# Patient Record
Sex: MALE | Race: WHITE
[De-identification: names, ages, dates, MRNs, and addresses within clinical notes are randomized per-mention and may not be internally consistent; named-entity substitution may affect disease eponyms.]

---

## 2019-03-05 ENCOUNTER — HOSPITAL ENCOUNTER (EMERGENCY)
Dept: HOSPITAL 72 - EMR | Age: 29
Discharge: HOME | End: 2019-03-05
Payer: COMMERCIAL

## 2019-03-05 VITALS — DIASTOLIC BLOOD PRESSURE: 78 MMHG | SYSTOLIC BLOOD PRESSURE: 128 MMHG

## 2019-03-05 VITALS — BODY MASS INDEX: 24.34 KG/M2 | WEIGHT: 170 LBS | HEIGHT: 70 IN

## 2019-03-05 VITALS — SYSTOLIC BLOOD PRESSURE: 125 MMHG | DIASTOLIC BLOOD PRESSURE: 70 MMHG

## 2019-03-05 DIAGNOSIS — T78.40XA: Primary | ICD-10-CM
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
## 2019-03-05 DIAGNOSIS — J45.909: ICD-10-CM

## 2019-03-05 DIAGNOSIS — Z91.018: ICD-10-CM

## 2019-03-05 PROCEDURE — 99282 EMERGENCY DEPT VISIT SF MDM: CPT

## 2019-03-05 NOTE — EMERGENCY ROOM REPORT
History of Present Illness


General


Chief Complaint:  Allergic Reaction


Source:  Patient





Present Illness


HPI


Patient is a 29-year-old male who presented after increased tongue and facial 

swelling.  Patient had prior history of multiple food allergies which include 

nuts and legumes.  Patient reportedly had been eating at a [place] restaurant 

and began having increased tongue swelling as well as lip burning.  He stated 

he took some Benadryl at location.  He subsequently used Epinephrine 

autoinjector.


Allergies:  


Uncoded Allergies:  


     NUTS (Allergy, Unknown, 3/5/19)





Patient History


Reviewed Nursing Documentation:  PMH: Agreed; PSxH: Agreed





Nursing Documentation-PM


Past Medical History:  No History, Except For


Hx Asthma:  Yes





Review of Systems


All Other Systems:  negative except mentioned in HPI





Physical Exam





Vital Signs








  Date Time  Temp Pulse Resp B/P (MAP) Pulse Ox O2 Delivery O2 Flow Rate FiO2


 


3/5/19 16:42 98.2 87 18 132/76 97 Room Air  








Sp02 EP Interpretation:  reviewed, normal


General Appearance:  normal inspection, well appearing, no apparent distress, 

alert, GCS 15


Head:  atraumatic


ENT:  normal ENT inspection, hearing grossly normal, normal pharynx, no 

angioedema, normal voice


Neck:  normal inspection, full range of motion, supple, no bony tend


Respiratory:  normal inspection, lungs clear, normal breath sounds, no 

respiratory distress, no retraction, no wheezing


Cardiovascular #1:  regular rate, rhythm, no edema


Gastrointestinal:  normal inspection, normal bowel sounds, non tender, soft, no 

guarding, no hernia


Genitourinary:  no CVA tenderness


Musculoskeletal:  normal inspection, back normal, normal range of motion


Neurologic:  normal inspection, alert, oriented x3, responsive, CNs III-XII nml 

as tested, speech normal


Psychiatric:  normal inspection, judgement/insight normal, mood/affect normal


Skin:  normal inspection, normal color, no rash





Medical Decision Making


Diagnostic Impression:  


 Primary Impression:  


 Allergic reaction


ER Course


Presented for allergic reaction.  Differential diagnosis include was not 

limited to anaphylaxis, anaphylactoid reaction, contact anxiety among others.  

Patient has a benign exam and does not appear to require any further imaging or 

laboratory testing at this time.  Patient was noted to have recently using 

EpiPen.  He was given prescription for medications for allergy.  He was given 

prednisone while in the emergency department.    Patient was advised to return 

if any worsening of condition.





Last Vital Signs








  Date Time  Temp Pulse Resp B/P (MAP) Pulse Ox O2 Delivery O2 Flow Rate FiO2


 


3/5/19 16:42 98.2 87 18 132/76 97 Room Air  








Status:  improved


Disposition:  HOME, SELF-CARE


Condition:  Stable


Scripts


Prednisone* (PREDNISONE*) 20 Mg Tablet


40 MG ORAL DAILY, #8 TAB


   Prov: Ty Correa MD         3/5/19 


Epinephrine (Epipen 2-Donell) 0.3 Mg/0.3 Ml Auto.injct


0.3 MG IM ONCE, #1 EA


   Prov: Ty Correa MD         3/5/19











Ty Correa MD Mar 5, 2019 17:00